# Patient Record
Sex: MALE | Race: WHITE | Employment: STUDENT | URBAN - METROPOLITAN AREA
[De-identification: names, ages, dates, MRNs, and addresses within clinical notes are randomized per-mention and may not be internally consistent; named-entity substitution may affect disease eponyms.]

---

## 2017-04-15 ENCOUNTER — OFFICE VISIT (OUTPATIENT)
Dept: URGENT CARE | Age: 22
End: 2017-04-15
Payer: COMMERCIAL

## 2017-04-15 PROCEDURE — G0382 LEV 3 HOSP TYPE B ED VISIT: HCPCS | Performed by: FAMILY MEDICINE

## 2017-04-15 PROCEDURE — S9083 URGENT CARE CENTER GLOBAL: HCPCS | Performed by: FAMILY MEDICINE

## 2018-05-02 ENCOUNTER — APPOINTMENT (EMERGENCY)
Dept: RADIOLOGY | Facility: HOSPITAL | Age: 23
End: 2018-05-02
Payer: COMMERCIAL

## 2018-05-02 ENCOUNTER — HOSPITAL ENCOUNTER (EMERGENCY)
Facility: HOSPITAL | Age: 23
Discharge: HOME | End: 2018-05-02
Attending: EMERGENCY MEDICINE | Admitting: EMERGENCY MEDICINE
Payer: COMMERCIAL

## 2018-05-02 VITALS
RESPIRATION RATE: 16 BRPM | HEART RATE: 88 BPM | TEMPERATURE: 97.8 F | WEIGHT: 174.16 LBS | SYSTOLIC BLOOD PRESSURE: 142 MMHG | OXYGEN SATURATION: 98 % | DIASTOLIC BLOOD PRESSURE: 98 MMHG

## 2018-05-02 DIAGNOSIS — S49.91XA INJURY OF RIGHT SHOULDER, INITIAL ENCOUNTER: ICD-10-CM
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
## 2018-05-02 DIAGNOSIS — S20.419A ABRASION OF BACK: ICD-10-CM

## 2018-05-02 PROCEDURE — 99283 EMERGENCY DEPT VISIT LOW MDM: CPT

## 2018-05-02 PROCEDURE — 73000 X-RAY EXAM OF COLLAR BONE: CPT

## 2018-05-02 PROCEDURE — 73030 X-RAY EXAM OF SHOULDER: CPT

## 2018-05-02 RX ORDER — ACETAMINOPHEN 325 MG/1
650 TABLET ORAL ONCE
Status: COMPLETED | OUTPATIENT
Start: 2018-05-02 | End: 2018-05-02

## 2018-05-02 RX ORDER — IBUPROFEN 400 MG/1
400 TABLET ORAL ONCE
Status: COMPLETED | OUTPATIENT
Start: 2018-05-02 | End: 2018-05-02

## 2018-05-02 RX ADMIN — IBUPROFEN 400 MG: 400 TABLET ORAL at 14:35

## 2018-05-02 RX ADMIN — ACETAMINOPHEN 650 MG: 325 TABLET, FILM COATED ORAL at 14:35

## 2018-05-02 NOTE — DISCHARGE INSTRUCTIONS
Diagnosis: fall/ right shoulder injury - possible mild separation of shoulder- which is a ligament injury -- abrasions to back     - activity as tolerated       - expect to feel sore and achy for next 1-2 weeks- might feel worse before getting better     - for pain- over the counter ibuprofen 400 mg- taken with over the counter tylenol 500 mg - get generics- taken together 4 times a day  With meals/ liquids    -- please wash abrasions with soap and water daily -- have someone inspect wounds daily- please return to  The er for any signs of wound infection- spreading redness/warmth/swelling around abrasions    - if stil having problems with right shoulder after 1 week -- still with pain and diminished range of motion- please call  t he orthopedic doctor to schedule an appointment- you can see any doctor in the group
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
## 2018-05-02 NOTE — ED NOTES
Discharge instructions reviewed with patient  Verbalized understanding  Arm sling applied to right arm prior to discharge       Debbie Holm  05/02/18 1925

## 2019-02-25 PROBLEM — S02.2XXA CLOSED DISPLACED FRACTURE OF NASAL BONE: Status: ACTIVE | Noted: 2019-02-25

## 2019-06-07 VITALS
DIASTOLIC BLOOD PRESSURE: 68 MMHG | BODY MASS INDEX: 23.52 KG/M2 | WEIGHT: 168 LBS | HEART RATE: 68 BPM | SYSTOLIC BLOOD PRESSURE: 99 MMHG | HEIGHT: 71 IN

## 2019-06-07 DIAGNOSIS — S43.102A ACROMIOCLAVICULAR JOINT SEPARATION, LEFT, INITIAL ENCOUNTER: Primary | ICD-10-CM

## 2019-06-07 PROCEDURE — 99203 OFFICE O/P NEW LOW 30 MIN: CPT | Performed by: ORTHOPAEDIC SURGERY

## 2021-04-13 DIAGNOSIS — Z23 ENCOUNTER FOR IMMUNIZATION: ICD-10-CM
